# Patient Record
Sex: MALE | Race: WHITE | NOT HISPANIC OR LATINO | Employment: FULL TIME | URBAN - METROPOLITAN AREA
[De-identification: names, ages, dates, MRNs, and addresses within clinical notes are randomized per-mention and may not be internally consistent; named-entity substitution may affect disease eponyms.]

---

## 2021-04-23 ENCOUNTER — TELEPHONE (OUTPATIENT)
Dept: ORTHOPEDICS | Facility: CLINIC | Age: 26
End: 2021-04-23

## 2021-04-23 ENCOUNTER — HOSPITAL ENCOUNTER (EMERGENCY)
Facility: OTHER | Age: 26
Discharge: HOME | End: 2021-04-23
Attending: EMERGENCY MEDICINE

## 2021-04-23 VITALS
HEART RATE: 90 BPM | SYSTOLIC BLOOD PRESSURE: 139 MMHG | TEMPERATURE: 98 F | HEIGHT: 69 IN | OXYGEN SATURATION: 100 % | BODY MASS INDEX: 24.44 KG/M2 | DIASTOLIC BLOOD PRESSURE: 90 MMHG | RESPIRATION RATE: 16 BRPM | WEIGHT: 165 LBS

## 2021-04-23 DIAGNOSIS — S62.339A CLOSED BOXER'S FRACTURE, INITIAL ENCOUNTER: Primary | ICD-10-CM

## 2021-04-23 PROCEDURE — 99284 EMERGENCY DEPT VISIT MOD MDM: CPT | Mod: 25

## 2021-04-23 PROCEDURE — 29125 APPL SHORT ARM SPLINT STATIC: CPT | Mod: RT

## 2021-04-23 RX ORDER — HYDROCODONE BITARTRATE AND ACETAMINOPHEN 5; 325 MG/1; MG/1
1 TABLET ORAL EVERY 4 HOURS PRN
Qty: 8 TABLET | Refills: 0 | Status: SHIPPED | OUTPATIENT
Start: 2021-04-23 | End: 2021-05-03

## 2021-04-23 RX ORDER — IBUPROFEN 600 MG/1
600 TABLET ORAL EVERY 6 HOURS PRN
Qty: 20 TABLET | Refills: 0 | Status: SHIPPED | OUTPATIENT
Start: 2021-04-23

## 2023-06-05 ENCOUNTER — HOSPITAL ENCOUNTER (EMERGENCY)
Facility: HOSPITAL | Age: 28
Discharge: HOME | End: 2023-06-05
Attending: STUDENT IN AN ORGANIZED HEALTH CARE EDUCATION/TRAINING PROGRAM

## 2023-06-05 VITALS
HEIGHT: 69 IN | TEMPERATURE: 98 F | DIASTOLIC BLOOD PRESSURE: 76 MMHG | RESPIRATION RATE: 14 BRPM | OXYGEN SATURATION: 98 % | BODY MASS INDEX: 24.88 KG/M2 | HEART RATE: 62 BPM | SYSTOLIC BLOOD PRESSURE: 126 MMHG | WEIGHT: 168 LBS

## 2023-06-05 DIAGNOSIS — S61.411A LACERATION OF RIGHT HAND WITHOUT FOREIGN BODY, INITIAL ENCOUNTER: Primary | ICD-10-CM

## 2023-06-05 DIAGNOSIS — Z23 NEED FOR TD VACCINE: ICD-10-CM

## 2023-06-05 PROCEDURE — 99284 EMERGENCY DEPT VISIT MOD MDM: CPT | Mod: 25

## 2023-06-05 PROCEDURE — 63600175 PHARM REV CODE 636 W HCPCS: Performed by: PHYSICIAN ASSISTANT

## 2023-06-05 PROCEDURE — 90715 TDAP VACCINE 7 YRS/> IM: CPT | Performed by: PHYSICIAN ASSISTANT

## 2023-06-05 PROCEDURE — 99283 EMERGENCY DEPT VISIT LOW MDM: CPT | Mod: 25,,, | Performed by: PHYSICIAN ASSISTANT

## 2023-06-05 PROCEDURE — 12002 PR RESUP NPTERF WND BODY 2.6-7.5 CM: ICD-10-PCS | Mod: ,,, | Performed by: PHYSICIAN ASSISTANT

## 2023-06-05 PROCEDURE — 12002 RPR S/N/AX/GEN/TRNK2.6-7.5CM: CPT | Mod: ,,, | Performed by: PHYSICIAN ASSISTANT

## 2023-06-05 PROCEDURE — 12002 RPR S/N/AX/GEN/TRNK2.6-7.5CM: CPT

## 2023-06-05 PROCEDURE — 25000003 PHARM REV CODE 250: Performed by: PHYSICIAN ASSISTANT

## 2023-06-05 PROCEDURE — 90471 IMMUNIZATION ADMIN: CPT | Performed by: PHYSICIAN ASSISTANT

## 2023-06-05 PROCEDURE — 99283 PR EMERGENCY DEPT VISIT,LEVEL III: ICD-10-PCS | Mod: 25,,, | Performed by: PHYSICIAN ASSISTANT

## 2023-06-05 RX ORDER — SULFAMETHOXAZOLE AND TRIMETHOPRIM 800; 160 MG/1; MG/1
1 TABLET ORAL 2 TIMES DAILY
Qty: 14 TABLET | Refills: 0 | Status: SHIPPED | OUTPATIENT
Start: 2023-06-05 | End: 2023-06-12

## 2023-06-05 RX ORDER — LIDOCAINE HYDROCHLORIDE 10 MG/ML
10 INJECTION INFILTRATION; PERINEURAL
Status: COMPLETED | OUTPATIENT
Start: 2023-06-05 | End: 2023-06-05

## 2023-06-05 RX ADMIN — LIDOCAINE HYDROCHLORIDE 10 ML: 10 INJECTION, SOLUTION INFILTRATION; PERINEURAL at 04:06

## 2023-06-05 RX ADMIN — TETANUS TOXOID, REDUCED DIPHTHERIA TOXOID AND ACELLULAR PERTUSSIS VACCINE, ADSORBED 0.5 ML: 5; 2.5; 8; 8; 2.5 SUSPENSION INTRAMUSCULAR at 04:06

## 2023-06-05 RX ADMIN — BACITRACIN ZINC, NEOMYCIN, POLYMYXIN B 1 EACH: 400; 3.5; 5 OINTMENT TOPICAL at 04:06

## 2023-06-05 NOTE — ED TRIAGE NOTES
Received with c/o laceration to right hand at knuckled. Not actively bleeding at this time.    Patient identifiers verified and correct for radha nate  LOC: The patient is awake, alert and oriented x 3. Appropriate affect; answers questions appropriately  APPEARANCE: Patient appears comfortable and in no acute distress, patient is clean and well groomed.  SKIN: The skin is warm and dry, color consistent with ethnicity, patient has normal skin turgor and moist mucus membranes, skin intact, pt denies breakdown or bruising  MUSCULOSKELETAL: Patient moving all extremities spontaneously, no edema noted. Ambulates with steady gait  RESPIRATORY: Airway is open and patent, respirations are spontaneous, patient has a normal effort and rate, no accessory muscle use noted,   CARDIAC: denies chest pain  GASTRO: soft and non-tender to palpation. Denies n/v/d/abd pain  : Pt denies any pain or frequency with urination.  NEURO: Pt opens eyes spontaneously, behavior appropriate to situation, follows commands, facial expression symmetrical, bilateral hand grasp equal and even, purposeful motor response noted,clear speech noted.

## 2023-06-05 NOTE — ED PROVIDER NOTES
Encounter Date: 6/5/2023       History     Chief Complaint   Patient presents with    Laceration     With glass across r knuckles      The patient is a 27-year-old male.  He denies any past medical history.  He presents to the emergency room for an urgent evaluation due to an acute laceration of the dorsal aspect of his right hand.  He states that while washing dishes, a glass broke and cut his hand.  The injury occurred 2 hours prior to arrival.  He is right-hand dominant.  He denies any suspected foreign body.  He denies any decreased range of motion.  He denies any numbness, tingling, weakness.  He denies any uncontrolled bleeding.  He states that he has not had a tetanus vaccine in more than 5 years.  He states that he rinsed the laceration with water and applied a bandage.  He states that he took 2 tablets of ibuprofen and has minimal pain at present.  He denies any additional injuries, symptoms, or concerns at this time.  He states that he works as a , but specifies that this injury occurred at home.    Review of patient's allergies indicates:  No Known Allergies  History reviewed. No pertinent past medical history.  History reviewed. No pertinent surgical history.  No family history on file.  Social History     Tobacco Use    Smoking status: Unknown   Substance Use Topics    Alcohol use: Not Currently    Drug use: Not Currently     Review of Systems   Constitutional:  Negative for diaphoresis.   Respiratory:  Negative for shortness of breath.    Cardiovascular:  Negative for chest pain.   Gastrointestinal:  Negative for nausea and vomiting.   Genitourinary:  Negative for decreased urine volume.   Musculoskeletal:  Negative for arthralgias and joint swelling.   Skin:  Positive for wound.   Allergic/Immunologic: Negative for immunocompromised state.   Neurological:  Negative for weakness, light-headedness and numbness.   Psychiatric/Behavioral:  Negative for confusion.      Physical Exam     Initial  Vitals [06/05/23 1444]   BP Pulse Resp Temp SpO2   (!) 142/76 81 18 98.8 °F (37.1 °C) 100 %      MAP       --         Physical Exam    Nursing note and vitals reviewed.  Constitutional: He appears well-developed and well-nourished. He is not diaphoretic.   HENT:   Head: Atraumatic.   Eyes: Conjunctivae are normal.   Neck: Neck supple.   Cardiovascular:  Normal rate and intact distal pulses.           2+ radial pulse.  Normal cap refill.   Pulmonary/Chest: No respiratory distress.   Abdominal: He exhibits no distension.   Musculoskeletal:      Cervical back: Neck supple.      Comments: There is an acute superficial linear but jagged laceration of the dorsal aspect of the right hand measuring approximately 4-5 cm in total length, overlying the 2nd and 3rd MCP joints.  Neurovascularly intact distal to wound.  No foreign body visualized.  Able to fully extend 2nd and 3rd digits, no tendon injury suspected.  See image.     Neurological: He is alert and oriented to person, place, and time. No sensory deficit. GCS score is 15. GCS eye subscore is 4. GCS verbal subscore is 5. GCS motor subscore is 6.   Skin: Skin is warm and dry.   Psychiatric: He has a normal mood and affect. His behavior is normal.         ED Course   Lac Repair    Date/Time: 6/5/2023 4:09 PM  Performed by: Aureliano Ribeiro PA-C  Authorized by: Camilla Bojorquez MD     Consent:     Consent obtained:  Verbal    Consent given by:  Patient    Risks discussed:  Retained foreign body, tendon damage, vascular damage, poor wound healing, poor cosmetic result, pain, infection, need for additional repair and nerve damage    Alternatives discussed:  Delayed treatment and referral  Universal protocol:     Procedure explained and questions answered to patient or proxy's satisfaction: yes      Patient identity confirmed:  Verbally with patient  Anesthesia:     Anesthesia method:  Local infiltration    Local anesthetic:  Lidocaine 1% w/o epi  Laceration details:      Location:  Hand    Hand location:  R hand, dorsum    Length (cm):  5  Pre-procedure details:     Preparation:  Patient was prepped and draped in usual sterile fashion and imaging obtained to evaluate for foreign bodies  Exploration:     Imaging obtained: x-ray      Imaging outcome: foreign body not noted      Wound exploration: wound explored through full range of motion and entire depth of wound visualized      Wound extent: no foreign bodies/material noted, no nerve damage noted, no tendon damage noted, no underlying fracture noted and no vascular damage noted    Treatment:     Area cleansed with:  Povidone-iodine and saline    Amount of cleaning:  Extensive    Irrigation solution:  Sterile saline    Irrigation method:  Syringe  Skin repair:     Repair method:  Sutures    Suture size:  4-0    Suture material:  Nylon    Suture technique:  Simple interrupted    Number of sutures:  5  Approximation:     Approximation:  Close  Repair type:     Repair type:  Simple  Post-procedure details:     Dressing:  Antibiotic ointment and sterile dressing    Procedure completion:  Tolerated well, no immediate complications  Labs Reviewed   HIV 1 / 2 ANTIBODY   HEPATITIS C ANTIBODY          Imaging Results              X-Ray Hand 3 view Right (Final result)  Result time 06/05/23 17:18:04      Final result by Ilia Chong MD (06/05/23 17:18:04)                   Impression:      No acute displaced fracture-dislocation or radiodense retained foreign body identified.    Fifth metacarpal healed fracture.      Electronically signed by: Ilia Chong MD  Date:    06/05/2023  Time:    17:18               Narrative:    EXAMINATION:  XR HAND COMPLETE 3 VIEW RIGHT    CLINICAL HISTORY:  hand laceration;    TECHNIQUE:  PA, lateral, and oblique views of the right hand were performed.    COMPARISON:  Right hand series 04/23/2021    FINDINGS:  Bones are well mineralized. Overall alignment is within normal limits.  Interval healed fracture  at the proximal 5th metacarpal with slight residual bowing deformity.  No new/acute displaced fracture, dislocation or destructive osseous process. Joint spaces appear relatively maintained.  No subcutaneous emphysema or radiodense retained foreign body.                                       Medications   LIDOcaine HCL 10 mg/ml (1%) injection 10 mL (10 mLs Infiltration Given 6/5/23 1637)   neomycin-bacitracnZn-polymyxnB packet (1 each Topical (Top) Given 6/5/23 1638)   Tdap (BOOSTRIX) vaccine injection 0.5 mL (0.5 mLs Intramuscular Given 6/5/23 1611)     Medical Decision Making:   Initial Assessment:   26 yo male, presenting to the ER for an acute injury of his right hand, patient states that he inadvertently cut the dorsal aspect of his right hand on a broken glass while washing dishes just prior to arrival  Differential Diagnosis:   Laceration, tendon injury, neurovascular injury, foreign body, wound infection, etcetera  Clinical Tests:   Radiological Study: Ordered and Reviewed  ED Management:  Vital signs reviewed, benign   Case discussed with the ER attending physician   x-ray of right hand completed, no acute fracture or foreign body identified, old boxer's fracture noted  Tetanus vaccine updated   Analgesic offered, patient declined stating that he took ibuprofen just prior to arrival rates pain level 2/10  Wound explored to base in bloodless field through full range of motion, no foreign body, no tendon injury identified  Wound copiously irrigated with normal saline and cleaned with Betadine antiseptic  Wound care instructions provided  Patient advised to follow up closely with primary care for wound check   Patient advised to return to the emergency room immediately if worse in any way  Additional MDM:     X-Rays: I have independently interpreted X-Ray(s) - see notes. No acute findings identified                       Clinical Impression:   Final diagnoses:  [S68.453A] Laceration of right hand without  foreign body, initial encounter (Primary)  [Z23] Need for Td vaccine        ED Disposition Condition    Discharge Stable          ED Prescriptions       Medication Sig Dispense Start Date End Date Auth. Provider    sulfamethoxazole-trimethoprim 800-160mg (BACTRIM DS) 800-160 mg Tab Take 1 tablet by mouth 2 (two) times daily. for 7 days 14 tablet 6/5/2023 6/12/2023 Aureliano Ribeiro PA-C          Follow-up Information       Follow up With Specialties Details Why Contact Info    [facility] Emergency Medicine  If symptoms worsen in any way. Follow up with primary care in the next 2 days for wound re-check. Tylenol and/or Ibuprofen as directed as needed for any pain. Return to the ER if worse in any way. [address]  [facility] [id number] [phone]             Aureliano Ribeiro PA-C  06/05/23 [id number]